# Patient Record
Sex: MALE | Race: WHITE | NOT HISPANIC OR LATINO | URBAN - NONMETROPOLITAN AREA
[De-identification: names, ages, dates, MRNs, and addresses within clinical notes are randomized per-mention and may not be internally consistent; named-entity substitution may affect disease eponyms.]

---

## 2021-07-06 ENCOUNTER — IMPORTED ENCOUNTER (OUTPATIENT)
Dept: URBAN - NONMETROPOLITAN AREA CLINIC 1 | Facility: CLINIC | Age: 26
End: 2021-07-06

## 2021-07-06 PROCEDURE — 92015 DETERMINE REFRACTIVE STATE: CPT

## 2021-07-06 PROCEDURE — 92014 COMPRE OPH EXAM EST PT 1/>: CPT

## 2021-07-06 NOTE — PATIENT DISCUSSION
Myopia-Discussed diagnosis with patient. -Explained that people who are myopic are at a higher risk for developing RD/RT and reviewed associated S&S.-Pt to contact our office if symptoms develop. Updated spec Rx given. Recommend lens that will provide comfort as well as protect safety and health of eyes.; 's Notes: Finished second yr at [employer]. Finished first year at [employer].   Shital Don 171

## 2022-04-09 ASSESSMENT — TONOMETRY
OD_IOP_MMHG: 16
OS_IOP_MMHG: 16

## 2022-04-09 ASSESSMENT — VISUAL ACUITY
OU_SC: 20/15
OD_SC: 20/20-1
OS_SC: 20/20-1

## 2025-03-19 ENCOUNTER — NEW PATIENT (OUTPATIENT)
Age: 30
End: 2025-03-19

## 2025-03-19 DIAGNOSIS — H52.13: ICD-10-CM

## 2025-03-19 PROCEDURE — 92015 DETERMINE REFRACTIVE STATE: CPT

## 2025-03-19 PROCEDURE — 92004 COMPRE OPH EXAM NEW PT 1/>: CPT
